# Patient Record
Sex: FEMALE | Race: ASIAN
[De-identification: names, ages, dates, MRNs, and addresses within clinical notes are randomized per-mention and may not be internally consistent; named-entity substitution may affect disease eponyms.]

---

## 2019-05-13 ENCOUNTER — HOSPITAL ENCOUNTER (EMERGENCY)
Dept: HOSPITAL 53 - M ED | Age: 64
Discharge: HOME | End: 2019-05-13
Payer: COMMERCIAL

## 2019-05-13 VITALS — BODY MASS INDEX: 21.39 KG/M2 | HEIGHT: 62 IN | WEIGHT: 116.25 LBS

## 2019-05-13 VITALS — DIASTOLIC BLOOD PRESSURE: 58 MMHG | SYSTOLIC BLOOD PRESSURE: 115 MMHG

## 2019-05-13 DIAGNOSIS — Z79.899: ICD-10-CM

## 2019-05-13 DIAGNOSIS — E11.9: ICD-10-CM

## 2019-05-13 DIAGNOSIS — N12: ICD-10-CM

## 2019-05-13 DIAGNOSIS — I10: ICD-10-CM

## 2019-05-13 DIAGNOSIS — E78.5: ICD-10-CM

## 2019-05-13 DIAGNOSIS — Z79.4: ICD-10-CM

## 2019-05-13 DIAGNOSIS — N20.1: Primary | ICD-10-CM

## 2019-05-13 LAB
ALBUMIN SERPL BCG-MCNC: 4.5 GM/DL (ref 3.2–5.2)
ALT SERPL W P-5'-P-CCNC: 23 U/L (ref 12–78)
BASOPHILS # BLD AUTO: 0 10^3/UL (ref 0–0.2)
BASOPHILS NFR BLD AUTO: 0.3 % (ref 0–1)
BILIRUB CONJ SERPL-MCNC: 0.1 MG/DL (ref 0–0.2)
BILIRUB SERPL-MCNC: 0.4 MG/DL (ref 0.2–1)
BUN SERPL-MCNC: 27 MG/DL (ref 7–18)
CALCIUM SERPL-MCNC: 8.8 MG/DL (ref 8.8–10.2)
CHLORIDE SERPL-SCNC: 101 MEQ/L (ref 98–107)
CO2 SERPL-SCNC: 27 MEQ/L (ref 21–32)
CREAT SERPL-MCNC: 0.98 MG/DL (ref 0.55–1.3)
EOSINOPHIL # BLD AUTO: 0 10^3/UL (ref 0–0.5)
EOSINOPHIL NFR BLD AUTO: 0 % (ref 0–3)
GFR SERPL CREATININE-BSD FRML MDRD: > 60 ML/MIN/{1.73_M2} (ref 45–?)
GLUCOSE SERPL-MCNC: 270 MG/DL (ref 70–100)
HCT VFR BLD AUTO: 34.4 % (ref 36–47)
HGB BLD-MCNC: 11.6 G/DL (ref 12–15.5)
LIPASE SERPL-CCNC: 291 U/L (ref 73–393)
LYMPHOCYTES # BLD AUTO: 1.2 10^3/UL (ref 1.5–4.5)
LYMPHOCYTES NFR BLD AUTO: 13 % (ref 24–44)
MCH RBC QN AUTO: 31.1 PG (ref 27–33)
MCHC RBC AUTO-ENTMCNC: 33.7 G/DL (ref 32–36.5)
MCV RBC AUTO: 92.2 FL (ref 80–96)
MONOCYTES # BLD AUTO: 0.3 10^3/UL (ref 0–0.8)
MONOCYTES NFR BLD AUTO: 3.7 % (ref 0–5)
NEUTROPHILS # BLD AUTO: 7.6 10^3/UL (ref 1.8–7.7)
NEUTROPHILS NFR BLD AUTO: 82.7 % (ref 36–66)
PLATELET # BLD AUTO: 258 10^3/UL (ref 150–450)
POTASSIUM SERPL-SCNC: 4.3 MEQ/L (ref 3.5–5.1)
PROT SERPL-MCNC: 7.4 GM/DL (ref 6.4–8.2)
RBC # BLD AUTO: 3.73 10^6/UL (ref 4–5.4)
SODIUM SERPL-SCNC: 134 MEQ/L (ref 136–145)
WBC # BLD AUTO: 9.2 10^3/UL (ref 4–10)

## 2019-05-13 PROCEDURE — 99284 EMERGENCY DEPT VISIT MOD MDM: CPT

## 2019-05-13 PROCEDURE — 96375 TX/PRO/DX INJ NEW DRUG ADDON: CPT

## 2019-05-13 PROCEDURE — 85025 COMPLETE CBC W/AUTO DIFF WBC: CPT

## 2019-05-13 PROCEDURE — 83690 ASSAY OF LIPASE: CPT

## 2019-05-13 PROCEDURE — 80048 BASIC METABOLIC PNL TOTAL CA: CPT

## 2019-05-13 PROCEDURE — 96361 HYDRATE IV INFUSION ADD-ON: CPT

## 2019-05-13 PROCEDURE — 80076 HEPATIC FUNCTION PANEL: CPT

## 2019-05-13 PROCEDURE — 96374 THER/PROPH/DIAG INJ IV PUSH: CPT

## 2019-05-13 PROCEDURE — 81001 URINALYSIS AUTO W/SCOPE: CPT

## 2019-05-13 PROCEDURE — 74177 CT ABD & PELVIS W/CONTRAST: CPT

## 2019-06-26 ENCOUNTER — HOSPITAL ENCOUNTER (OUTPATIENT)
Dept: HOSPITAL 53 - M LRY | Age: 64
End: 2019-06-26
Attending: NURSE PRACTITIONER
Payer: COMMERCIAL

## 2019-06-26 ENCOUNTER — HOSPITAL ENCOUNTER (OUTPATIENT)
Dept: HOSPITAL 53 - M SFHCLERA | Age: 64
End: 2019-06-26
Attending: NURSE PRACTITIONER
Payer: COMMERCIAL

## 2019-06-26 DIAGNOSIS — R10.9: Primary | ICD-10-CM

## 2019-06-26 DIAGNOSIS — R93.89: Primary | ICD-10-CM

## 2021-02-24 ENCOUNTER — APPOINTMENT (OUTPATIENT)
Dept: UROLOGY | Facility: CLINIC | Age: 66
End: 2021-02-24
Payer: MEDICARE

## 2021-02-24 VITALS
HEIGHT: 62 IN | OXYGEN SATURATION: 97 % | RESPIRATION RATE: 16 BRPM | DIASTOLIC BLOOD PRESSURE: 74 MMHG | SYSTOLIC BLOOD PRESSURE: 135 MMHG | TEMPERATURE: 98 F | BODY MASS INDEX: 19.69 KG/M2 | WEIGHT: 107 LBS | HEART RATE: 95 BPM

## 2021-02-24 DIAGNOSIS — N95.2 POSTMENOPAUSAL ATROPHIC VAGINITIS: ICD-10-CM

## 2021-02-24 DIAGNOSIS — I10 ESSENTIAL (PRIMARY) HYPERTENSION: ICD-10-CM

## 2021-02-24 DIAGNOSIS — E11.9 TYPE 2 DIABETES MELLITUS W/OUT COMPLICATIONS: ICD-10-CM

## 2021-02-24 DIAGNOSIS — Z84.1 FAMILY HISTORY OF DISORDERS OF KIDNEY AND URETER: ICD-10-CM

## 2021-02-24 DIAGNOSIS — R31.9 HEMATURIA, UNSPECIFIED: ICD-10-CM

## 2021-02-24 PROBLEM — Z00.00 ENCOUNTER FOR PREVENTIVE HEALTH EXAMINATION: Status: ACTIVE | Noted: 2021-02-24

## 2021-02-24 PROCEDURE — 99072 ADDL SUPL MATRL&STAF TM PHE: CPT

## 2021-02-24 PROCEDURE — 99204 OFFICE O/P NEW MOD 45 MIN: CPT

## 2021-02-24 RX ORDER — OMEPRAZOLE 20 MG/1
20 TABLET, DELAYED RELEASE ORAL
Refills: 0 | Status: ACTIVE | COMMUNITY

## 2021-02-24 RX ORDER — MAGNESIUM OXIDE 241.3 MG/1000MG
400 TABLET ORAL
Refills: 0 | Status: ACTIVE | COMMUNITY

## 2021-02-24 RX ORDER — LOSARTAN POTASSIUM 25 MG/1
25 TABLET, FILM COATED ORAL
Refills: 0 | Status: ACTIVE | COMMUNITY

## 2021-02-24 RX ORDER — SIMVASTATIN 20 MG/1
20 TABLET, FILM COATED ORAL
Refills: 0 | Status: ACTIVE | COMMUNITY

## 2021-02-24 RX ORDER — ESTRADIOL 10 UG/1
10 TABLET, FILM COATED VAGINAL
Qty: 24 | Refills: 1 | Status: ACTIVE | COMMUNITY
Start: 2021-02-24 | End: 1900-01-01

## 2021-02-24 RX ORDER — METFORMIN ER 750 MG 750 MG/1
750 TABLET ORAL
Refills: 0 | Status: ACTIVE | COMMUNITY

## 2021-02-24 RX ORDER — MULTIVIT-MIN/FA/LYCOPEN/LUTEIN .4-300-25
TABLET ORAL
Refills: 0 | Status: ACTIVE | COMMUNITY

## 2021-02-24 RX ORDER — AMLODIPINE BESYLATE 5 MG/1
5 TABLET ORAL
Refills: 0 | Status: ACTIVE | COMMUNITY

## 2021-02-28 PROBLEM — I10 HYPERTENSION: Status: ACTIVE | Noted: 2021-02-28

## 2021-02-28 PROBLEM — E11.9 DIABETES MELLITUS, TYPE 2: Status: ACTIVE | Noted: 2021-02-28

## 2021-02-28 NOTE — ASSESSMENT
[FreeTextEntry1] : 64 yo F with blood spots on underwear, history of nephrolithiasis, vaginal atrophy on physical exam\par \par - Reviewed CT from last Feb 2020 done at [facility] which showed small nonobstructing stone in the left kidney. Unlikely this is the reason for her RLQ pain.\par - Reviewed potential etiologies for her red spotting. Given she just had a normal cysto within the last year and vaginal atrophy, likely vaginal. Discussed pros and cons of estrace and Rx transmitted\par - UA and culture\par - Discussed possible etiologies for nephrolithiasis. Reviewed behavioral modifications including adequate hydration, cutting back on coffee, dark sodas, dark teas, low sodium diet, increasing citrate levels with lemon juice. \par - Discussed importance of seeking medical attention should intractable flank pain with nausea, vomiting or fever occur\par

## 2021-02-28 NOTE — HISTORY OF PRESENT ILLNESS
[FreeTextEntry1] : 66 yo Occitan speaking F noticed some red spots on her underwear\par Unsure if hematuria\par Some bilateral low back pain recently with some RLQ pain with palpation\par Drinks 3-4 cups of water, rarely coffee\par No dysuria, no significant LUTS\par Went to urgent care and per pt, urine tests showed microhematuria\par History of microhematuria in the past - cysto last year with outside urologist (Dr. Araya) was normal\par History of stones - last CT in Feb 2020 showed a nonobstructing stone\par \par

## 2021-02-28 NOTE — PHYSICAL EXAM
[General Appearance - Well Developed] : well developed [General Appearance - Well Nourished] : well nourished [Normal Appearance] : normal appearance [Well Groomed] : well groomed [General Appearance - In No Acute Distress] : no acute distress [Edema] : no peripheral edema [Respiration, Rhythm And Depth] : normal respiratory rhythm and effort [Exaggerated Use Of Accessory Muscles For Inspiration] : no accessory muscle use [Abdomen Soft] : soft [Abdomen Tenderness] : non-tender [Costovertebral Angle Tenderness] : no ~M costovertebral angle tenderness [Urethral Meatus] : normal urethra [Urinary Bladder Findings] : the bladder was normal on palpation [Normal Station and Gait] : the gait and station were normal for the patient's age [] : no rash [No Focal Deficits] : no focal deficits [Oriented To Time, Place, And Person] : oriented to person, place, and time [Affect] : the affect was normal [Mood] : the mood was normal [Not Anxious] : not anxious [No Palpable Adenopathy] : no palpable adenopathy [FreeTextEntry1] : mild vaginal atrophy

## 2021-03-03 LAB
APPEARANCE: ABNORMAL
BACTERIA UR CULT: NORMAL
BACTERIA: NEGATIVE
BILIRUBIN URINE: NEGATIVE
BLOOD URINE: NEGATIVE
CALCIUM OXALATE CRYSTALS: ABNORMAL
COLOR: YELLOW
GLUCOSE QUALITATIVE U: ABNORMAL
HYALINE CASTS: 0 /LPF
KETONES URINE: NEGATIVE
LEUKOCYTE ESTERASE URINE: NEGATIVE
MICROSCOPIC-UA: NORMAL
NITRITE URINE: NEGATIVE
PH URINE: 6
PROTEIN URINE: NORMAL
RED BLOOD CELLS URINE: 4 /HPF
SPECIFIC GRAVITY URINE: 1.02
SQUAMOUS EPITHELIAL CELLS: 1 /HPF
URINE COMMENTS: NORMAL
UROBILINOGEN URINE: NORMAL
WHITE BLOOD CELLS URINE: 2 /HPF

## 2021-08-25 ENCOUNTER — APPOINTMENT (OUTPATIENT)
Dept: UROLOGY | Facility: CLINIC | Age: 66
End: 2021-08-25
Payer: MEDICAID

## 2021-08-25 VITALS
OXYGEN SATURATION: 99 % | SYSTOLIC BLOOD PRESSURE: 119 MMHG | HEART RATE: 92 BPM | RESPIRATION RATE: 16 BRPM | DIASTOLIC BLOOD PRESSURE: 77 MMHG

## 2021-08-25 DIAGNOSIS — N20.0 CALCULUS OF KIDNEY: ICD-10-CM

## 2021-08-25 DIAGNOSIS — R10.9 UNSPECIFIED ABDOMINAL PAIN: ICD-10-CM

## 2021-08-25 PROCEDURE — 99214 OFFICE O/P EST MOD 30 MIN: CPT

## 2021-08-29 PROBLEM — R10.9 FLANK PAIN: Status: ACTIVE | Noted: 2021-08-29

## 2021-08-29 PROBLEM — N20.0 NEPHROLITHIASIS: Status: ACTIVE | Noted: 2021-02-28

## 2021-08-29 NOTE — ASSESSMENT
[FreeTextEntry1] : 67 yo F with history of nephrolithiasis with flank pain\par \par - CT stone hunt\par - Discussed possible etiologies for nephrolithiasis. Reviewed behavioral modifications including adequate hydration, cutting back on coffee, dark sodas, dark teas, low sodium diet, increasing citrate levels with lemon juice. \par - Discussed importance of seeking medical attention should intractable flank pain with nausea, vomiting or fever occur\par

## 2021-08-29 NOTE — PHYSICAL EXAM
[General Appearance - Well Developed] : well developed [General Appearance - Well Nourished] : well nourished [Normal Appearance] : normal appearance [Well Groomed] : well groomed [General Appearance - In No Acute Distress] : no acute distress [Abdomen Soft] : soft [Abdomen Tenderness] : non-tender [Costovertebral Angle Tenderness] : no ~M costovertebral angle tenderness [Urethral Meatus] : normal urethra [FreeTextEntry1] : mild vaginal atrophy - deferred today [Urinary Bladder Findings] : the bladder was normal on palpation [Edema] : no peripheral edema [] : no respiratory distress [Respiration, Rhythm And Depth] : normal respiratory rhythm and effort [Exaggerated Use Of Accessory Muscles For Inspiration] : no accessory muscle use [Oriented To Time, Place, And Person] : oriented to person, place, and time [Affect] : the affect was normal [Mood] : the mood was normal [Not Anxious] : not anxious [Normal Station and Gait] : the gait and station were normal for the patient's age [No Focal Deficits] : no focal deficits [No Palpable Adenopathy] : no palpable adenopathy

## 2021-08-29 NOTE — HISTORY OF PRESENT ILLNESS
[FreeTextEntry1] : 64 yo Faroese speaking F noticed some red spots on her underwear\par Unsure if hematuria\par Some bilateral low back pain recently with some RLQ pain with palpation\par Drinks 3-4 cups of water, rarely coffee\par No dysuria, no significant LUTS\par Went to urgent care and per pt, urine tests showed microhematuria\par History of microhematuria in the past - cysto last year with outside urologist (Dr. Araya) was normal\par History of stones - last CT in Feb 2020 showed a nonobstructing stone\par \par 8/25/21 Interval history: right flank pain intermittently for the last month\par no fever, chills\par no gross hematuria\par \par

## 2023-07-07 NOTE — REASON FOR VISIT
"----- Message from Brenda Bartholomew LPN sent at 7/7/2023  8:59 AM CDT -----  Regarding: pv jessica 7/14 [id number]  Are there any outstanding tasks in chart? No, but needs FASTING labs, TO BE DONE AT  "[facility]" or lab location of choice PRIOR to appt    Is there any documentation of tasks? no    Has the pt seen another physician, been to ER, UCC, or admitted to hospital since last visit?    Has the pt done blood work or imaging since last visit?    5. PLEASE HAVE PATIENT BRING MEDICATION LIST OR BOTTLES TO EVERY OFFICE VISIT     " [Follow-up Visit ___] : a follow-up visit  for [unfilled]